# Patient Record
Sex: FEMALE
[De-identification: names, ages, dates, MRNs, and addresses within clinical notes are randomized per-mention and may not be internally consistent; named-entity substitution may affect disease eponyms.]

---

## 2017-10-27 ENCOUNTER — RX RENEWAL (OUTPATIENT)
Age: 37
End: 2017-10-27

## 2018-02-20 ENCOUNTER — RX RENEWAL (OUTPATIENT)
Age: 38
End: 2018-02-20

## 2018-02-20 DIAGNOSIS — Z83.6 FAMILY HISTORY OF OTHER DISEASES OF THE RESPIRATORY SYSTEM: ICD-10-CM

## 2018-02-20 DIAGNOSIS — Z83.79 FAMILY HISTORY OF OTHER DISEASES OF THE DIGESTIVE SYSTEM: ICD-10-CM

## 2018-02-20 DIAGNOSIS — Z82.3 FAMILY HISTORY OF STROKE: ICD-10-CM

## 2018-02-20 DIAGNOSIS — Z87.76 PERSONAL HISTORY OF (CORRECTED) CONGENITAL MALFORMATIONS OF INTEGUMENT, LIMBS AND MUSCULOSKELETAL SYSTEM: ICD-10-CM

## 2018-02-20 DIAGNOSIS — Z83.49 FAMILY HISTORY OF OTHER ENDOCRINE, NUTRITIONAL AND METABOLIC DISEASES: ICD-10-CM

## 2018-02-20 DIAGNOSIS — Z83.3 FAMILY HISTORY OF DIABETES MELLITUS: ICD-10-CM

## 2018-02-20 DIAGNOSIS — M19.90 UNSPECIFIED OSTEOARTHRITIS, UNSPECIFIED SITE: ICD-10-CM

## 2018-02-20 DIAGNOSIS — Z78.9 OTHER SPECIFIED HEALTH STATUS: ICD-10-CM

## 2018-02-20 DIAGNOSIS — Z82.49 FAMILY HISTORY OF ISCHEMIC HEART DISEASE AND OTHER DISEASES OF THE CIRCULATORY SYSTEM: ICD-10-CM

## 2018-02-20 DIAGNOSIS — Z80.42 FAMILY HISTORY OF MALIGNANT NEOPLASM OF PROSTATE: ICD-10-CM

## 2018-05-25 ENCOUNTER — RX RENEWAL (OUTPATIENT)
Age: 38
End: 2018-05-25

## 2018-05-30 ENCOUNTER — RX RENEWAL (OUTPATIENT)
Age: 38
End: 2018-05-30

## 2018-07-03 ENCOUNTER — RX RENEWAL (OUTPATIENT)
Age: 38
End: 2018-07-03

## 2018-07-05 ENCOUNTER — RX RENEWAL (OUTPATIENT)
Age: 38
End: 2018-07-05

## 2018-07-09 ENCOUNTER — APPOINTMENT (OUTPATIENT)
Dept: INTERNAL MEDICINE | Facility: CLINIC | Age: 38
End: 2018-07-09
Payer: COMMERCIAL

## 2018-07-09 VITALS
HEIGHT: 69 IN | DIASTOLIC BLOOD PRESSURE: 90 MMHG | OXYGEN SATURATION: 98 % | BODY MASS INDEX: 30.36 KG/M2 | SYSTOLIC BLOOD PRESSURE: 128 MMHG | HEART RATE: 86 BPM | TEMPERATURE: 98.7 F | WEIGHT: 205 LBS

## 2018-07-09 VITALS — DIASTOLIC BLOOD PRESSURE: 78 MMHG | SYSTOLIC BLOOD PRESSURE: 120 MMHG

## 2018-07-09 DIAGNOSIS — Z87.2 PERSONAL HISTORY OF DISEASES OF THE SKIN AND SUBCUTANEOUS TISSUE: ICD-10-CM

## 2018-07-09 PROCEDURE — 99213 OFFICE O/P EST LOW 20 MIN: CPT

## 2018-07-09 NOTE — HISTORY OF PRESENT ILLNESS
[de-identified] : Pt presented for f/u.  She has ran out of meds, and needs to get her meds.  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  She still has to take care of her father.  \par \par She sprained her R ankle in May.  She is now back to exercise as of last week.

## 2018-07-09 NOTE — ASSESSMENT
[FreeTextEntry1] : 38 year old  obese female presents for renewal of her antihypertensive medications and antidepressants. The patient has been absent from our office were 1-1/2 year, she has been busy because she was  the primary caretaker for a sick parent. Her mother passed away a few weeks ago, patient is now finally to contact to take care of herself.\par \par Depression with anxiety, the patient feels that the Lexapro has been helping her through this journey. She is doing well without any side effect, I will continue the medications and it was renewed as requested. Counseling was also provided, patient appears to cope pretty well with the loss of her mother.\par \par Hypertension, her blood pressure was adequately controlled on the current medication. Amlodipine was renewed as well.\par \par Obesity, the patient has been overweight most of her adult life, is gaining more weight the last couple of years. Her BMI is now in the range of obesity. We discussed her goal weight, she was encouraged to modify her lifestyle with exercise and diet.\par \par The patient is overdue for blood tests and a physical exam. Prescription for pertussis given to her, she will schedule a physical exam and had labs done prior to her office visit.

## 2018-07-09 NOTE — PHYSICAL EXAM
[No Acute Distress] : no acute distress [Well Nourished] : well nourished [Well Developed] : well developed [No Respiratory Distress] : no respiratory distress  [Clear to Auscultation] : lungs were clear to auscultation bilaterally [Normal Rate] : normal rate  [Regular Rhythm] : with a regular rhythm [Normal S1, S2] : normal S1 and S2 [Soft] : abdomen soft [Non Tender] : non-tender [Non-distended] : non-distended [No Masses] : no abdominal mass palpated [Normal Bowel Sounds] : normal bowel sounds [No Joint Swelling] : no joint swelling [Grossly Normal Strength/Tone] : grossly normal strength/tone [de-identified] : Obese female,

## 2018-08-20 ENCOUNTER — APPOINTMENT (OUTPATIENT)
Dept: INTERNAL MEDICINE | Facility: CLINIC | Age: 38
End: 2018-08-20
Payer: COMMERCIAL

## 2018-08-20 ENCOUNTER — LABORATORY RESULT (OUTPATIENT)
Age: 38
End: 2018-08-20

## 2018-08-20 VITALS
BODY MASS INDEX: 29.62 KG/M2 | TEMPERATURE: 98.5 F | SYSTOLIC BLOOD PRESSURE: 130 MMHG | OXYGEN SATURATION: 98 % | HEART RATE: 94 BPM | WEIGHT: 200 LBS | HEIGHT: 69 IN | DIASTOLIC BLOOD PRESSURE: 90 MMHG

## 2018-08-20 VITALS — SYSTOLIC BLOOD PRESSURE: 126 MMHG | DIASTOLIC BLOOD PRESSURE: 82 MMHG

## 2018-08-20 DIAGNOSIS — E66.3 OVERWEIGHT: ICD-10-CM

## 2018-08-20 DIAGNOSIS — Z00.00 ENCOUNTER FOR GENERAL ADULT MEDICAL EXAMINATION W/OUT ABNORMAL FINDINGS: ICD-10-CM

## 2018-08-20 DIAGNOSIS — Z87.891 PERSONAL HISTORY OF NICOTINE DEPENDENCE: ICD-10-CM

## 2018-08-20 DIAGNOSIS — I10 ESSENTIAL (PRIMARY) HYPERTENSION: ICD-10-CM

## 2018-08-20 DIAGNOSIS — E55.9 VITAMIN D DEFICIENCY, UNSPECIFIED: ICD-10-CM

## 2018-08-20 DIAGNOSIS — E78.5 HYPERLIPIDEMIA, UNSPECIFIED: ICD-10-CM

## 2018-08-20 DIAGNOSIS — R82.90 UNSPECIFIED ABNORMAL FINDINGS IN URINE: ICD-10-CM

## 2018-08-20 LAB
BILIRUB UR QL STRIP: ABNORMAL
CLARITY UR: CLEAR
COLLECTION METHOD: NORMAL
GLUCOSE UR-MCNC: NORMAL
HCG UR QL: 0.2 EU/DL
HGB UR QL STRIP.AUTO: NORMAL
KETONES UR-MCNC: NORMAL
LEUKOCYTE ESTERASE UR QL STRIP: NORMAL
NITRITE UR QL STRIP: NORMAL
PH UR STRIP: 5.5
PROT UR STRIP-MCNC: 30
SP GR UR STRIP: 1.01

## 2018-08-20 PROCEDURE — 99395 PREV VISIT EST AGE 18-39: CPT | Mod: 25

## 2018-08-20 PROCEDURE — 81003 URINALYSIS AUTO W/O SCOPE: CPT | Mod: QW

## 2018-08-20 RX ORDER — CHLORHEXIDINE GLUCONATE 4 %
LIQUID (ML) TOPICAL DAILY
Qty: 90 | Refills: 1 | Status: ACTIVE | COMMUNITY
Start: 2018-08-20

## 2018-08-20 NOTE — HEALTH RISK ASSESSMENT
[Good] : ~his/her~ current health as good [Very Good] : ~his/her~  mood as very good [No falls in past year] : Patient reported no falls in the past year [0] : 2) Feeling down, depressed, or hopeless: Not at all (0) [None] : None [With Family] : lives with family [# of Members in Household ___] :  household currently consist of [unfilled] member(s) [Graduate School] : graduate school [] :  [# Of Children ___] : has [unfilled] children [Feels Safe at Home] : Feels safe at home [Fully functional (bathing, dressing, toileting, transferring, walking, feeding)] : Fully functional (bathing, dressing, toileting, transferring, walking, feeding) [Fully functional (using the telephone, shopping, preparing meals, housekeeping, doing laundry, using] : Fully functional and needs no help or supervision to perform IADLs (using the telephone, shopping, preparing meals, housekeeping, doing laundry, using transportation, managing medications and managing finances) [Smoke Detector] : smoke detector [Carbon Monoxide Detector] : carbon monoxide detector [Seat Belt] :  uses seat belt [] : No [de-identified] : occasionally [Change in mental status noted] : No change in mental status noted [Reports changes in hearing] : Reports no changes in hearing [Reports changes in vision] : Reports no changes in vision [Reports changes in dental health] : Reports no changes in dental health [MammogramDate] : Never [PapSmearDate] : 05/18 [BoneDensityDate] : Never [ColonoscopyDate] : 09/16 [ColonoscopyComments] : EGD = 9/2016 [de-identified] : Margot [de-identified] : eye exam - 11/2017 [de-identified] : dentist - 10/2018

## 2018-08-20 NOTE — REVIEW OF SYSTEMS
[Wheezing] : wheezing [Negative] : Heme/Lymph [Chills] : no chills [Fatigue] : no fatigue [Recent Change In Weight] : ~T no recent weight change [Shortness Of Breath] : no shortness of breath [Cough] : no cough [Dyspnea on Exertion] : no dyspnea on exertion [Abdominal Pain] : no abdominal pain [Nausea] : no nausea [Constipation] : no constipation [Diarrhea] : diarrhea [Vomiting] : no vomiting [Heartburn] : no heartburn [Melena] : no melena [Dysuria] : no dysuria [Incontinence] : no incontinence [Nocturia] : no nocturia [Hematuria] : no hematuria [Joint Pain] : no joint pain [Joint Stiffness] : no joint stiffness [Joint Swelling] : no joint swelling [Muscle Pain] : no muscle pain [Back Pain] : no back pain [Itching] : no itching [Mole Changes] : no mole changes [Skin Rash] : no skin rash [Headache] : no headache [Dizziness] : no dizziness [Fainting] : no fainting [Insomnia] : no insomnia [Anxiety] : no anxiety [Depression] : no depression [Easy Bleeding] : no easy bleeding [Easy Bruising] : no easy bruising [Swollen Glands] : no swollen glands [FreeTextEntry3] : wears corrective lens,

## 2018-08-20 NOTE — PAST MEDICAL HISTORY
[Menstruating] : menstruating [Menarche Age ____] : age at menarche was [unfilled] [Definite ___ (Date)] : the last menstrual period was [unfilled] [Approximately ___] : the LMP was approximately [unfilled] [Normal Amount/Duration] : it was of a normal amount and duration [Regular Cycle Intervals] : have been regular [Total Preg ___] : G[unfilled] [Live Births ___] : P[unfilled]

## 2018-08-20 NOTE — ASSESSMENT
[FreeTextEntry1] : Patient is UTD with Pap smear, she was reminded to have routine eye exam and dental care.

## 2018-08-20 NOTE — PHYSICAL EXAM
[No Acute Distress] : no acute distress [Well Nourished] : well nourished [Well Developed] : well developed [Normal Sclera/Conjunctiva] : normal sclera/conjunctiva [PERRL] : pupils equal round and reactive to light [EOMI] : extraocular movements intact [Normal Oropharynx] : the oropharynx was normal [Normal TMs] : both tympanic membranes were normal [Normal Nasal Mucosa] : the nasal mucosa was normal [No JVD] : no jugular venous distention [Supple] : supple [No Lymphadenopathy] : no lymphadenopathy [No Respiratory Distress] : no respiratory distress  [Clear to Auscultation] : lungs were clear to auscultation bilaterally [Normal Rate] : normal rate  [Regular Rhythm] : with a regular rhythm [Normal S1, S2] : normal S1 and S2 [No Carotid Bruits] : no carotid bruits [Pedal Pulses Present] : the pedal pulses are present [No Edema] : there was no peripheral edema [No Extremity Clubbing/Cyanosis] : no extremity clubbing/cyanosis [Normal Appearance] : normal in appearance [No Masses] : no palpable masses [No Axillary Lymphadenopathy] : no axillary lymphadenopathy [Soft] : abdomen soft [Non Tender] : non-tender [Non-distended] : non-distended [Normal Bowel Sounds] : normal bowel sounds [Normal Supraclavicular Nodes] : no supraclavicular lymphadenopathy [Normal Axillary Nodes] : no axillary lymphadenopathy [Normal Posterior Cervical Nodes] : no posterior cervical lymphadenopathy [Normal Anterior Cervical Nodes] : no anterior cervical lymphadenopathy [No CVA Tenderness] : no CVA  tenderness [No Spinal Tenderness] : no spinal tenderness [No Joint Swelling] : no joint swelling [Grossly Normal Strength/Tone] : grossly normal strength/tone [No Rash] : no rash [Normal Gait] : normal gait [Coordination Grossly Intact] : coordination grossly intact [No Focal Deficits] : no focal deficits [Speech Grossly Normal] : speech grossly normal [Normal Affect] : the affect was normal [Alert and Oriented x3] : oriented to person, place, and time [Normal Mood] : the mood was normal [de-identified] : Overweight female in stated age,

## 2018-08-20 NOTE — DATA REVIEWED
[FreeTextEntry1] : Labs 8/8/2018 reviewed - Chol - 224,  HDL - 39, LDL - 154, TG - 155,\par                                            Vitamin D - 18.2, \par                                            normal CMP and TFTs

## 2018-08-20 NOTE — HISTORY OF PRESENT ILLNESS
[de-identified] : Pt presented for PE.  Last PE was 1/2017.  His/Her health was uneventful since her last visit, he/she has no new complaint.

## 2018-09-10 LAB
APPEARANCE: CLEAR
BACTERIA UR CULT: NORMAL
BACTERIA: NEGATIVE
BILIRUBIN URINE: NEGATIVE
BLOOD URINE: NEGATIVE
COLOR: YELLOW
GLUCOSE QUALITATIVE U: NEGATIVE MG/DL
HYALINE CASTS: 0 /LPF
KETONES URINE: NEGATIVE
LEUKOCYTE ESTERASE URINE: ABNORMAL
MICROSCOPIC-UA: NORMAL
NITRITE URINE: NEGATIVE
PH URINE: 6.5
PROTEIN URINE: NEGATIVE MG/DL
RED BLOOD CELLS URINE: 4 /HPF
SPECIFIC GRAVITY URINE: 1.02
SQUAMOUS EPITHELIAL CELLS: 5 /HPF
UROBILINOGEN URINE: NEGATIVE MG/DL
WHITE BLOOD CELLS URINE: 4 /HPF

## 2018-09-24 ENCOUNTER — APPOINTMENT (OUTPATIENT)
Dept: INTERNAL MEDICINE | Facility: CLINIC | Age: 38
End: 2018-09-24

## 2018-10-24 ENCOUNTER — APPOINTMENT (OUTPATIENT)
Dept: INTERNAL MEDICINE | Facility: CLINIC | Age: 38
End: 2018-10-24
Payer: COMMERCIAL

## 2018-10-24 VITALS
BODY MASS INDEX: 30.51 KG/M2 | OXYGEN SATURATION: 97 % | HEIGHT: 69 IN | WEIGHT: 206 LBS | DIASTOLIC BLOOD PRESSURE: 70 MMHG | HEART RATE: 78 BPM | SYSTOLIC BLOOD PRESSURE: 118 MMHG | TEMPERATURE: 99.3 F

## 2018-10-24 DIAGNOSIS — Z23 ENCOUNTER FOR IMMUNIZATION: ICD-10-CM

## 2018-10-24 PROCEDURE — G0008: CPT

## 2018-10-24 PROCEDURE — 90686 IIV4 VACC NO PRSV 0.5 ML IM: CPT

## 2018-10-24 NOTE — PHYSICAL EXAM
[No Acute Distress] : no acute distress [Well Nourished] : well nourished [Well Developed] : well developed [No Respiratory Distress] : no respiratory distress  [Clear to Auscultation] : lungs were clear to auscultation bilaterally [Normal Rate] : normal rate  [Regular Rhythm] : with a regular rhythm [Normal S1, S2] : normal S1 and S2 [No Edema] : there was no peripheral edema [No Extremity Clubbing/Cyanosis] : no extremity clubbing/cyanosis [Soft] : abdomen soft [Non Tender] : non-tender [Normal Bowel Sounds] : normal bowel sounds [No Joint Swelling] : no joint swelling [Grossly Normal Strength/Tone] : grossly normal strength/tone [de-identified] : female in stated age,

## 2018-10-24 NOTE — HISTORY OF PRESENT ILLNESS
[de-identified] : Pt presented for Flu vaccine.  She feels well with no new complaint.\par \par She has been having abdominal pain and discomfort for about 3 weeks, she cannot figure out what's the trigger, she has apt with GI soon.

## 2018-11-13 ENCOUNTER — APPOINTMENT (OUTPATIENT)
Dept: GASTROENTEROLOGY | Facility: CLINIC | Age: 38
End: 2018-11-13

## 2018-12-11 ENCOUNTER — APPOINTMENT (OUTPATIENT)
Dept: INTERNAL MEDICINE | Facility: CLINIC | Age: 38
End: 2018-12-11
Payer: COMMERCIAL

## 2018-12-11 VITALS — SYSTOLIC BLOOD PRESSURE: 122 MMHG | TEMPERATURE: 99.3 F | DIASTOLIC BLOOD PRESSURE: 82 MMHG

## 2018-12-11 VITALS
SYSTOLIC BLOOD PRESSURE: 120 MMHG | HEIGHT: 69 IN | TEMPERATURE: 99.1 F | WEIGHT: 205 LBS | DIASTOLIC BLOOD PRESSURE: 82 MMHG | OXYGEN SATURATION: 98 % | HEART RATE: 102 BPM | BODY MASS INDEX: 30.36 KG/M2

## 2018-12-11 DIAGNOSIS — J30.9 ALLERGIC RHINITIS, UNSPECIFIED: ICD-10-CM

## 2018-12-11 DIAGNOSIS — F41.8 OTHER SPECIFIED ANXIETY DISORDERS: ICD-10-CM

## 2018-12-11 DIAGNOSIS — S06.0X9A CONCUSSION WITH LOSS OF CONSCIOUSNESS OF UNSPECIFIED DURATION, INITIAL ENCOUNTER: ICD-10-CM

## 2018-12-11 DIAGNOSIS — Z87.09 PERSONAL HISTORY OF OTHER DISEASES OF THE RESPIRATORY SYSTEM: ICD-10-CM

## 2018-12-11 PROCEDURE — 99214 OFFICE O/P EST MOD 30 MIN: CPT

## 2018-12-11 RX ORDER — FLUTICASONE PROPIONATE 50 UG/1
50 SPRAY, METERED NASAL
Qty: 1 | Refills: 1 | Status: ACTIVE | COMMUNITY
Start: 2018-12-11 | End: 1900-01-01

## 2018-12-11 RX ORDER — LEVOCETIRIZINE DIHYDROCHLORIDE 5 MG/1
5 TABLET ORAL
Qty: 90 | Refills: 1 | Status: ACTIVE | COMMUNITY
Start: 2018-12-11 | End: 1900-01-01

## 2018-12-11 RX ORDER — LEVOFLOXACIN 500 MG/1
500 TABLET, FILM COATED ORAL DAILY
Qty: 10 | Refills: 0 | Status: ACTIVE | COMMUNITY
Start: 2018-12-11 | End: 1900-01-01

## 2018-12-11 NOTE — HISTORY OF PRESENT ILLNESS
[Cold Symptoms] : cold symptoms [Moderate] : moderate [___ Weeks ago] :  [unfilled] weeks ago [Paroxysmal] : paroxysmal [Cough] : cough [Chills] : chills [Anorexia] : anorexia [Fatigue] : fatigue [Headache] : headache [Fever] : fever [Tmax= ___] : Tmax = [unfilled] [OTC Remedies] : OTC remedies [At Night] : at night [Worsening] : worsening [Congestion] : no congestion [Sore Throat] : no sore throat [Wheezing] : no wheezing [Shortness Of Breath] : no shortness of breath [Earache] : no earache [FreeTextEntry2] : She's not able to cough up phlegm at first, now coughing up some yellow phlegm, she's sneezing, and has yellow nasal discharge, myalgia.  No N/V/D, eye discomfort.  Ears feel full & popping. [FreeTextEntry5] : Tylenol & cough drops.  She's on Ayr NS. [FreeTextEntry8] : Pt feels like she's had sinus symptoms in the fall every year for the last 10 years, now she's having symptoms in the spring.  This year, she's been having symptoms since September on & off, and  she was treated at the urgent care center.  She was given ProAir and Bromfed Dm, she was better after that.  She had symptoms again 3 weeks later, she got better when she went away.  Then before Thanksgiving, she had symptoms for a few days.  She had symptoms again in the last few days.\par \par She also fell badly before Thanksgiving, she went to urgent care center.  She had a CT scan of the brain, and that was negative.  She was told she had a mild concussion.   She was foggy for about 10 days, and is much better now.\par \par She has been seeing a psychotherapist for the loss of her mom.  She was coping well at the beginning, but when thanksgiving holiday arrived.  It became harder, and she has been very emotional and crying easily.  She is seeing a therapist, but hasn't helped her much.  Her father also moved in to live with her, and it's extra responsibilities.  It has become overwhelming, when she had more problem with her sinus in the last few months.  The fall prior to Thanksgiving.  Pt just wanted to feel better.

## 2018-12-11 NOTE — PHYSICAL EXAM
[No Acute Distress] : no acute distress [Well Nourished] : well nourished [Well Developed] : well developed [Normal Sclera/Conjunctiva] : normal sclera/conjunctiva [PERRL] : pupils equal round and reactive to light [EOMI] : extraocular movements intact [Normal TMs] : both tympanic membranes were normal [No JVD] : no jugular venous distention [Supple] : supple [No Lymphadenopathy] : no lymphadenopathy [No Respiratory Distress] : no respiratory distress  [Clear to Auscultation] : lungs were clear to auscultation bilaterally [Normal Rate] : normal rate  [Regular Rhythm] : with a regular rhythm [Normal S1, S2] : normal S1 and S2 [No Edema] : there was no peripheral edema [No Extremity Clubbing/Cyanosis] : no extremity clubbing/cyanosis [Soft] : abdomen soft [Non Tender] : non-tender [Normal Bowel Sounds] : normal bowel sounds [Normal Supraclavicular Nodes] : no supraclavicular lymphadenopathy [Normal Posterior Cervical Nodes] : no posterior cervical lymphadenopathy [Normal Anterior Cervical Nodes] : no anterior cervical lymphadenopathy [No Joint Swelling] : no joint swelling [Grossly Normal Strength/Tone] : grossly normal strength/tone [Normal Gait] : normal gait [Coordination Grossly Intact] : coordination grossly intact [No Focal Deficits] : no focal deficits [Speech Grossly Normal] : speech grossly normal [Normal Affect] : the affect was normal [Alert and Oriented x3] : oriented to person, place, and time [de-identified] : female in stated age,  [de-identified] : Mild maxillary and ethmoid sinus tenderness noted, nasal turbinates were congested, the pharynx was not erythematous, but has postnasal drip.  [de-identified] : pt was emotional during her visit, and cried when she talked about losing her mother.

## 2018-12-11 NOTE — ASSESSMENT
[FreeTextEntry1] : Pt probably has allergic rhinitis in the fall and possibly in the spring.  She feels that her symptoms was more severe and more frequent this past few months.   Pt has also been during with more stress and responsibilities.\par Latest symptoms was probably worsened by URI, I will treat her with Xyzal, Flonase NS.  I recommended that she tries sinus rinse.  She can also take Benadryl as needed at bedtime.  If her symptoms king not improve after a couple of days, I prescribed Levaquin to treat possible sinusitis.  Pt should also consider seeing ENT if she keeps on getting recurrence of symptoms.

## 2019-01-23 ENCOUNTER — RX RENEWAL (OUTPATIENT)
Age: 39
End: 2019-01-23

## 2019-01-23 RX ORDER — ESCITALOPRAM OXALATE 10 MG/1
10 TABLET ORAL
Qty: 90 | Refills: 1 | Status: ACTIVE | COMMUNITY
Start: 2018-02-20 | End: 1900-01-01

## 2019-06-26 ENCOUNTER — APPOINTMENT (OUTPATIENT)
Dept: GASTROENTEROLOGY | Facility: CLINIC | Age: 39
End: 2019-06-26
Payer: COMMERCIAL

## 2019-06-26 VITALS
HEIGHT: 69 IN | BODY MASS INDEX: 29.77 KG/M2 | SYSTOLIC BLOOD PRESSURE: 132 MMHG | WEIGHT: 201 LBS | HEART RATE: 99 BPM | DIASTOLIC BLOOD PRESSURE: 90 MMHG | OXYGEN SATURATION: 98 % | TEMPERATURE: 98.8 F

## 2019-06-26 DIAGNOSIS — K58.9 IRRITABLE BOWEL SYNDROME W/OUT DIARRHEA: ICD-10-CM

## 2019-06-26 PROCEDURE — 99214 OFFICE O/P EST MOD 30 MIN: CPT

## 2019-06-26 RX ORDER — RANITIDINE 150 MG/1
150 TABLET ORAL
Qty: 60 | Refills: 3 | Status: ACTIVE | COMMUNITY
Start: 2019-06-26 | End: 1900-01-01

## 2019-06-26 NOTE — HISTORY OF PRESENT ILLNESS
[FreeTextEntry1] : Patient came to the office today with irritable bowel type symptoms with loose stools crampy abdominal pain slight nausea. She has been under a great deal of stress recently due to her home situation and upcoming trip. She currently denies nausea vomiting fever chills rectal bleeding or melena. He has no cardiac or pulmonary complaints.

## 2019-06-26 NOTE — ASSESSMENT
[FreeTextEntry1] : Impression\par \par Patient presents today with irritable bowel type symptomatology she is under great stress to the home situation. She was counseled in this regard. Patient will to some modification of her diet would be to start predominant. She will get back to me at the return trip from [facility]. She will be gone for about 10 days. She will maintain a food diary and we will discuss upon our next conversation. Over-the-counter H2 blockers encouraged along with Pepto-Bismol as needed

## 2019-10-14 ENCOUNTER — RX RENEWAL (OUTPATIENT)
Age: 39
End: 2019-10-14

## 2019-10-14 RX ORDER — AMLODIPINE BESYLATE 5 MG/1
5 TABLET ORAL
Qty: 90 | Refills: 0 | Status: ACTIVE | COMMUNITY
Start: 2017-10-27 | End: 1900-01-01

## 2020-12-16 PROBLEM — Z87.09 HISTORY OF ACUTE SINUSITIS: Status: RESOLVED | Noted: 2018-12-11 | Resolved: 2020-12-16

## 2021-10-06 PROBLEM — I10 ESSENTIAL HYPERTENSION: Status: ACTIVE | Noted: 2017-10-27
